# Patient Record
Sex: FEMALE | Race: WHITE
[De-identification: names, ages, dates, MRNs, and addresses within clinical notes are randomized per-mention and may not be internally consistent; named-entity substitution may affect disease eponyms.]

---

## 2020-06-06 ENCOUNTER — HOSPITAL ENCOUNTER (EMERGENCY)
Dept: HOSPITAL 53 - M ED | Age: 65
Discharge: HOME | End: 2020-06-06
Payer: COMMERCIAL

## 2020-06-06 VITALS — HEIGHT: 65 IN | WEIGHT: 147.31 LBS | BODY MASS INDEX: 24.54 KG/M2

## 2020-06-06 VITALS — SYSTOLIC BLOOD PRESSURE: 150 MMHG | DIASTOLIC BLOOD PRESSURE: 78 MMHG

## 2020-06-06 DIAGNOSIS — F17.200: ICD-10-CM
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
## 2020-06-06 DIAGNOSIS — S82.141A: Primary | ICD-10-CM
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
## 2020-06-06 DIAGNOSIS — Z88.0: ICD-10-CM
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
## 2020-06-06 PROCEDURE — 73564 X-RAY EXAM KNEE 4 OR MORE: CPT

## 2020-06-06 PROCEDURE — 73590 X-RAY EXAM OF LOWER LEG: CPT

## 2020-06-06 PROCEDURE — 99284 EMERGENCY DEPT VISIT MOD MDM: CPT

## 2020-06-09 NOTE — REP
FOUR VIEWS OF THE RIGHT KNEE AND THREE VIEWS RIGHT TIBIA/FIBULA:

 

INDICATION:  Trauma, fracture.

 

COMPARISON: None.

 

FINDINGS:

 

There is a comminuted fracture through the lateral tibial plateau and tibial

metaphysis.  There is overlying soft tissue edema as well as an associated joint

effusion/hemarthrosis.  There is a suspected displaced fracture through the

fibular head as well, which is poorly visualized.  Alternative consideration is

degenerative changes which is felt less likely.

 

IMPRESSION:

 

Comminuted fracture involving the lateral tibial plateau and tibial metaphysis

with joint effusion/hemarthrosis and adjacent soft tissue edema.

 

Likely displaced fracture through the right fibular head.

 

 

Electronically Signed by

Leonides Cm DO 06/09/2020 03:08 P